# Patient Record
Sex: MALE | Race: WHITE
[De-identification: names, ages, dates, MRNs, and addresses within clinical notes are randomized per-mention and may not be internally consistent; named-entity substitution may affect disease eponyms.]

---

## 2018-12-06 ENCOUNTER — HOSPITAL ENCOUNTER (EMERGENCY)
Dept: HOSPITAL 76 - ED | Age: 33
Discharge: HOME | End: 2018-12-06
Payer: SELF-PAY

## 2018-12-06 VITALS — DIASTOLIC BLOOD PRESSURE: 88 MMHG | SYSTOLIC BLOOD PRESSURE: 124 MMHG

## 2018-12-06 DIAGNOSIS — K02.9: ICD-10-CM

## 2018-12-06 DIAGNOSIS — K05.10: Primary | ICD-10-CM

## 2018-12-06 DIAGNOSIS — F17.200: ICD-10-CM

## 2018-12-06 PROCEDURE — 99283 EMERGENCY DEPT VISIT LOW MDM: CPT

## 2018-12-06 NOTE — ED PHYSICIAN DOCUMENTATION
PD HPI HEENT





- Stated complaint


Stated Complaint: MOUTH PX





- Chief complaint


Chief Complaint: Heent





- History obtained from


History obtained from: Patient





- History of Present Illness


Timing - onset: Chronic


Timing - details: Constant


Severity Comments: moderate


Location: Tooth, Mouth


Improves: Nothing


Worsens: Temperatures


Associated symptoms: No: Fever, Rhinorrhea, Unable to swallow, Swollen nodes, 

Facial swelling


Similar symptoms before: Other (The patient has not followed up with the dentist

for his ongoing chronic problem)


Recently seen: Emergency Dept





Review of Systems


Constitutional: denies: Fever, Chills


Eyes: denies: Discharge


Ears: denies: Ear pain


Nose: denies: Rhinorrhea / runny nose


Throat: reports: Dental pain / toothache


Cardiac: denies: Palpitations


Musculoskeletal: denies: Neck pain





PD PAST MEDICAL HISTORY





- Past Surgical History


Past Surgical History: No





- Present Medications


Home Medications: 


                                Ambulatory Orders











 Medication  Instructions  Recorded  Confirmed


 


Amoxicillin 875 mg PO BID #20 tablet 12/06/18 














- Allergies


Allergies/Adverse Reactions: 


                                    Allergies











Allergy/AdvReac Type Severity Reaction Status Date / Time


 


No Known Drug Allergies Allergy   Verified 12/06/18 22:19














- Social History


Does the pt smoke?: Yes


Smoking Status: Current every day smoker


Does the pt drink ETOH?: No


Does the pt have substance abuse?: Yes





- Immunizations


Immunizations are current?: No





PD ED PE NORMAL





- General


General: Alert and oriented X 3, No acute distress





- HEENT


HEENT: Atraumatic, PERRL, EOMI





- Neck


Neck: Supple, no meningeal sign





- Neuro


Neuro: Alert and oriented X 3, Normal speech





- Psych


Psych: Normal affect





PD ED PE EXPANDED





- HEENT


HEENT: Atraumatic, Head injury, Pharynx normal, Dental decay (The patient has 

extensive dental decay, every tooth is associated with dental caries and the 

patient has gingivitis.  There is no dental abscess, there is no evidence of 

facial cellulitis or facial abscess).  No: Pharyngeal erythema, Swollen tonsils,

Tonsillar exudate, Soft palate petecchiae, PTA, Dental abscess, Dry socket, Lip 

laceration, Tongue laceration, Buccal laceration





Results





- Vitals


Vitals: 


                               Vital Signs - 24 hr











  12/06/18





  22:16


 


Temperature 37.0 C


 


Heart Rate 96


 


Respiratory 20





Rate 


 


Blood Pressure 126/90 H


 


O2 Saturation 98








                                     Oxygen











O2 Source                      Room air

















PD MEDICAL DECISION MAKING





- ED course


ED course: 





The patient has extensive dental caries, there is no evidence of abscess or 

drainable lesion.  There is no facial swelling or signs of a facial abscess or 

cellulitis.  Presently the patient appears appropriate for discharge.  The 

patient's tongue is within normal limits and the floor the mouth is moist and 

soft and there is no evidence of Rashawn's angina.  The patient is planning to 

follow-up with dental for definitive management.  I discussed warning signs and 

recommended returning for any worsening or any concerns.





Departure





- Departure


Disposition: 01 Home, Self Care


Clinical Impression: 


 Dental caries





Condition: Good


Instructions:  ED Tooth Pain, ED Cavity Dental


Prescriptions: 


Amoxicillin 875 mg PO BID #20 tablet


Comments: 


Please follow up with your dentist tomorrow for further evaluation of your 

symptoms





Please return to the emergency department for worsening symptoms or any concerns

## 2021-12-27 ENCOUNTER — HOSPITAL ENCOUNTER (EMERGENCY)
Dept: HOSPITAL 76 - ED | Age: 36
Discharge: HOME | End: 2021-12-27
Payer: MEDICAID

## 2021-12-27 VITALS — DIASTOLIC BLOOD PRESSURE: 81 MMHG | SYSTOLIC BLOOD PRESSURE: 136 MMHG

## 2021-12-27 DIAGNOSIS — F17.200: ICD-10-CM

## 2021-12-27 DIAGNOSIS — U07.1: Primary | ICD-10-CM

## 2021-12-27 LAB
B PARAPERT DNA SPEC QL NAA+PROBE: NOT DETECTED
B PERT DNA SPEC QL NAA+PROBE: NOT DETECTED
C PNEUM DNA NPH QL NAA+NON-PROBE: NOT DETECTED
FLUAV RNA RESP QL NAA+PROBE: NOT DETECTED
HAEM INFLU B DNA SPEC QL NAA+PROBE: NOT DETECTED
HCOV 229E RNA SPEC QL NAA+PROBE: NOT DETECTED
HCOV HKU1 RNA UPPER RESP QL NAA+PROBE: NOT DETECTED
HCOV NL63 RNA ASPIRATE QL NAA+PROBE: NOT DETECTED
HCOV OC43 RNA SPEC QL NAA+PROBE: NOT DETECTED
HMPV AG SPEC QL: NOT DETECTED
HPIV1 RNA NPH QL NAA+PROBE: NOT DETECTED
HPIV2 SPEC QL CULT: NOT DETECTED
HPIV3 AB TITR SER CF: NOT DETECTED {TITER}
HPIV4 RNA SPEC QL NAA+PROBE: NOT DETECTED
M PNEUMO DNA SPEC QL NAA+PROBE: NOT DETECTED
RSV RNA RESP QL NAA+PROBE: NOT DETECTED
RV+EV RNA SPEC QL NAA+PROBE: NOT DETECTED
SARS-COV-2 RNA PNL SPEC NAA+PROBE: DETECTED

## 2021-12-27 PROCEDURE — 0202U NFCT DS 22 TRGT SARS-COV-2: CPT

## 2021-12-27 PROCEDURE — 99283 EMERGENCY DEPT VISIT LOW MDM: CPT

## 2021-12-27 NOTE — ED PHYSICIAN DOCUMENTATION
History of Present Illness





- Stated complaint


Stated Complaint: HIP PX,HEADACHE





- Chief complaint


Chief Complaint: General





- History obtained from


History obtained from: Patient





- History of Present Illness


Timing: Today


Pain level max: 7


Pain level now: 5





- Additonal information


Additional information: 





Patient is a 36-year-old male who presents to the emergency department 

complaining of generalized body aches, fevers, cough and nasal congestion that 

started today.  Entire family has been sick at home.  He states that he took 

Motrin earlier today without relief.  Worse with movement, better with rest.  

Denies any cigarette, alcohol, marijuana, drug use.  No IV drug use.  Denies any

medications at home.  Denies any past medical history.  Has had 1 dose of a 

Covid vaccination, this was in November he believes.





Review of Systems


Ten Systems: 10 systems reviewed and negative


Constitutional: reports: Fever, Chills, Myalgias


Nose: reports: Rhinorrhea / runny nose, Congestion


Throat: denies: Sore throat


Cardiac: denies: Chest pain / pressure


Respiratory: reports: Cough


GI: denies: Vomiting, Diarrhea


Skin: denies: Rash


Musculoskeletal: denies: Neck pain, Back pain


Neurologic: denies: Headache





PD PAST MEDICAL HISTORY





- Past Medical History


Past Medical History: Yes


Neuro: Headaches





- Past Surgical History


Past Surgical History: No





- Present Medications


Home Medications: 


                                Ambulatory Orders











 Medication  Instructions  Recorded  Confirmed


 


Amoxicillin 875 mg PO BID #20 tablet 12/06/18 














- Allergies


Allergies/Adverse Reactions: 


                                    Allergies











Allergy/AdvReac Type Severity Reaction Status Date / Time


 


No Known Drug Allergies Allergy   Verified 12/27/21 20:00














- Social History


Does the pt smoke?: Yes


Smoking Status: Current every day smoker


Does the pt drink ETOH?: No


Does the pt have substance abuse?: Yes





- Immunizations


Immunizations are current?: No





PD ED PE NORMAL





- Vitals


Vital signs reviewed: Yes





- General


General: Alert and oriented X 3, No acute distress, Well developed/nourished





- HEENT


HEENT: PERRL, Ears normal, Moist mucous membranes, Pharynx benign





- Neck


Neck: Supple, no meningeal sign





- Cardiac


Cardiac: RRR, Strong equal pulses





- Respiratory


Respiratory: No respiratory distress, Clear bilaterally





- Abdomen


Abdomen: Soft, Non tender, Non distended





- Derm


Derm: Warm and dry, No rash





- Extremities


Extremities: No edema





- Neuro


Neuro: Alert and oriented X 3





- Psych


Psych: Normal mood, Normal affect





Results





- Vitals


Vitals: 


                               Vital Signs - 24 hr











  12/27/21





  19:56


 


Temperature 37 C


 


Heart Rate 109 H


 


Respiratory 20





Rate 


 


Blood Pressure 99/59 L


 


O2 Saturation 97








                                     Oxygen











O2 Source                      Room air

















- Labs


Labs: 


                                Laboratory Tests











  12/27/21





  20:17


 


Nasal Adenovirus (PCR)  NOT DETECTED


 


Nasal B. parapertussis DNA (PCR)  NOT DETECTED


 


Nasal Coronavir 229E PCR  NOT DETECTED


 


Nasal Coronavir HKU1 PCR  NOT DETECTED


 


Nasal Coronavir NL63 PCR  NOT DETECTED


 


Nasal Coronavir OC43 PCR  NOT DETECTED


 


Nasal Enterovir/Rhinovir PCR  NOT DETECTED


 


Nasal Influenza B PCR  NOT DETECTED


 


Nasal Influenza A PCR  NOT DETECTED


 


Nasal Parainfluen 1 PCR  NOT DETECTED


 


Nasal Parainfluen 2 PCR  NOT DETECTED


 


Nasal Parainfluen 3 PCR  NOT DETECTED


 


Nasal Parainfluen 4 PCR  NOT DETECTED


 


Nasal RSV (PCR)  NOT DETECTED


 


Nasal B.pertussis DNA PCR  NOT DETECTED


 


Nasal C.pneumoniae (PCR)  NOT DETECTED


 


Jigar Human Metapneumo PCR  NOT DETECTED


 


Nasal M.pneumoniae (PCR)  NOT DETECTED


 


Nasal SARS-CoV-2 (PCR)  DETECTED A














PD MEDICAL DECISION MAKING





- ED course


Complexity details: considered differential, d/w patient


ED course: 





36-year-old male positive for Covid.  He is not a candidate for monoclonal 

antibody therapy.  Does not meet any criteria.  We will continue supportive care

and have him follow-up with his doctor as needed.  Patient is well-appearing, 

nontoxic.  Afebrile.  Feels better after Toradol and Tylenol.  Patient counseled

regarding signs and symptoms for which I believe and urgent re-evaluation would 

be necessary. Patient with good understanding of and agreement to plan and is 

comfortable going home at this time





This document was made in part using voice recognition software. While efforts 

are made to proofread this document, sound alike and grammatical errors may 

occur.





Departure





- Departure


Disposition: 01 Home, Self Care


Clinical Impression: 


 COVID-19





Condition: Good


Instructions:  ED Viral Syndrome


Follow-Up: 


your,doctor as needed [Other]


Comments: 


You have tested positive for Covid today.  You need to self isolate at home.  

Please follow-up with your doctor for further care.  Return if you worsen.  You 

do not currently meet criteria for monoclonal antibody therapy.

## 2022-01-02 ENCOUNTER — HOSPITAL ENCOUNTER (EMERGENCY)
Dept: HOSPITAL 76 - ED | Age: 37
LOS: 1 days | Discharge: HOME | End: 2022-01-03
Payer: MEDICAID

## 2022-01-02 VITALS — DIASTOLIC BLOOD PRESSURE: 93 MMHG | SYSTOLIC BLOOD PRESSURE: 130 MMHG

## 2022-01-02 DIAGNOSIS — R07.89: Primary | ICD-10-CM

## 2022-01-02 DIAGNOSIS — U07.1: ICD-10-CM

## 2022-01-02 DIAGNOSIS — F17.200: ICD-10-CM

## 2022-01-02 PROCEDURE — 71045 X-RAY EXAM CHEST 1 VIEW: CPT

## 2022-01-02 PROCEDURE — 99282 EMERGENCY DEPT VISIT SF MDM: CPT

## 2022-01-02 PROCEDURE — 93005 ELECTROCARDIOGRAM TRACING: CPT

## 2022-01-02 PROCEDURE — 99284 EMERGENCY DEPT VISIT MOD MDM: CPT

## 2022-01-03 NOTE — XRAY REPORT
PROCEDURE:  Chest 1 View X-Ray

 

INDICATIONS:  chest pain

 

TECHNIQUE:  One view of the chest was acquired.  

 

COMPARISON:  2/20/2016.

 

FINDINGS:  

 

Surgical changes and devices:  None.  

 

Lungs and pleura:  No pleural effusions or pneumothorax.  Lungs are clear.  

 

Mediastinum:  Mediastinal contours appear normal.  Heart size is normal.  

 

Bones and chest wall:  No suspicious bony lesions.  Overlying soft tissues appear unremarkable.  

 

IMPRESSION:  

 

1. No acute cardiopulmonary disease.

 

Reviewed by: Lamont Otto MD on 1/3/2022 12:01 AM PST

Approved by: Lamont Otto MD on 1/3/2022 12:01 AM [facility]

 

 

Station ID:  [id number]